# Patient Record
Sex: MALE | Race: WHITE | URBAN - METROPOLITAN AREA
[De-identification: names, ages, dates, MRNs, and addresses within clinical notes are randomized per-mention and may not be internally consistent; named-entity substitution may affect disease eponyms.]

---

## 2021-04-16 DIAGNOSIS — Z23 ENCOUNTER FOR IMMUNIZATION: ICD-10-CM

## 2021-04-23 ENCOUNTER — IMMUNIZATIONS (OUTPATIENT)
Dept: FAMILY MEDICINE CLINIC | Facility: HOSPITAL | Age: 46
End: 2021-04-23

## 2021-04-23 DIAGNOSIS — Z23 ENCOUNTER FOR IMMUNIZATION: Primary | ICD-10-CM

## 2021-04-23 PROCEDURE — 91301 SARS-COV-2 / COVID-19 MRNA VACCINE (MODERNA) 100 MCG: CPT

## 2021-04-23 PROCEDURE — 0011A SARS-COV-2 / COVID-19 MRNA VACCINE (MODERNA) 100 MCG: CPT

## 2021-10-12 ENCOUNTER — IMMUNIZATIONS (OUTPATIENT)
Dept: FAMILY MEDICINE CLINIC | Facility: HOSPITAL | Age: 46
End: 2021-10-12

## 2021-10-12 DIAGNOSIS — Z23 ENCOUNTER FOR IMMUNIZATION: Primary | ICD-10-CM

## 2021-10-12 PROCEDURE — 91301 SARS-COV-2 / COVID-19 MRNA VACCINE (MODERNA) 100 MCG: CPT

## 2021-10-12 PROCEDURE — 0012A SARS-COV-2 / COVID-19 MRNA VACCINE (MODERNA) 100 MCG: CPT

## 2022-03-07 ENCOUNTER — OFFICE VISIT (OUTPATIENT)
Dept: OBGYN CLINIC | Facility: HOSPITAL | Age: 47
End: 2022-03-07
Payer: COMMERCIAL

## 2022-03-07 ENCOUNTER — HOSPITAL ENCOUNTER (OUTPATIENT)
Dept: RADIOLOGY | Facility: HOSPITAL | Age: 47
Discharge: HOME | End: 2022-03-07
Payer: COMMERCIAL

## 2022-03-07 VITALS
HEIGHT: 69 IN | HEART RATE: 71 BPM | WEIGHT: 240 LBS | BODY MASS INDEX: 35.55 KG/M2 | DIASTOLIC BLOOD PRESSURE: 84 MMHG | SYSTOLIC BLOOD PRESSURE: 144 MMHG

## 2022-03-07 DIAGNOSIS — M79.672 PAIN IN LEFT FOOT: Primary | ICD-10-CM

## 2022-03-07 DIAGNOSIS — M79.672 PAIN IN LEFT FOOT: ICD-10-CM

## 2022-03-07 DIAGNOSIS — M84.375A STRESS FRACTURE OF METATARSAL BONE OF LEFT FOOT, INITIAL ENCOUNTER: ICD-10-CM

## 2022-03-07 PROCEDURE — 99203 OFFICE O/P NEW LOW 30 MIN: CPT | Performed by: PHYSICIAN ASSISTANT

## 2022-03-07 PROCEDURE — 73630 X-RAY EXAM OF FOOT: CPT

## 2022-03-07 NOTE — PROGRESS NOTES
Assessment:    Left plantar based 2nd MTP pain, activity related only which has basically resolved with rest alone  Possible stress fracture 2nd metatarsal      Plan: Will provide Cam boot for weight-bearing which patient can wear if pain develops again  Recommend continued rest and holding off on running over the next 4 weeks  Ibuprofen as needed  Follow up with the podiatry team for re-evaluation   May need MRI of the left foot in the future if his symptoms arise  Problem List Items Addressed This Visit        Other    Pain in left foot - Primary                   Subjective:     Patient ID:  Edgardo Wyatt is a 40 y o  male    HPI    67-year-old male presenting for evaluation of his left foot  According to the patient, about 2-3 weeks ago he developed pain localized to the plantar surface of his foot that came about while running on the treadmill  He typically jogs on the treadmill frequently and noticed that the sole of his foot just proximal to the second toe has been painful which he describes as sharp and sometimes dull that has persisted  He had to take ibuprofen with some relief  He states he stopped running on the treadmill and it basically resolved on its own  He walks several miles over the weekend without any issues  There is no injury or trauma to the area  No associated numbness and tingling  He denies any ankle pain  The following portions of the patient's history were reviewed and updated as appropriate: allergies, current medications, past family history, past medical history, past social history, past surgical history and problem list     Review of Systems     Objective:    Imaging:  Left foot x-rays no acute abnormalities  Vitals:    03/07/22 0837   BP: 144/84   Pulse: 71         Physical Exam     Orthopedic Examination:  Left foot     Inspection:  There is no open wounds or erythema  No swelling  No ecchymosis      Palpation:  The 1st-2nd tarsal metatarsal region is not tender to palpation   Fifth metatarsal zone is nontender to palpation  NTTP to palpation at the 2nd  MTPe    Range-of-motion:  Normal range of motion of the ankle    Strength:  5/5 ankle plantar dorsiflexion    Sensation:  Intact    Special Tests:  Foot is of normal color, good cap refill of toes

## 2022-05-04 ENCOUNTER — OFFICE VISIT (OUTPATIENT)
Dept: OBGYN CLINIC | Facility: HOSPITAL | Age: 47
End: 2022-05-04
Payer: COMMERCIAL

## 2022-05-04 ENCOUNTER — HOSPITAL ENCOUNTER (OUTPATIENT)
Dept: RADIOLOGY | Facility: HOSPITAL | Age: 47
Discharge: HOME | End: 2022-05-04
Payer: COMMERCIAL

## 2022-05-04 DIAGNOSIS — M25.562 ACUTE PAIN OF LEFT KNEE: ICD-10-CM

## 2022-05-04 DIAGNOSIS — M25.562 ACUTE PAIN OF LEFT KNEE: Primary | ICD-10-CM

## 2022-05-04 PROCEDURE — 99213 OFFICE O/P EST LOW 20 MIN: CPT | Performed by: PHYSICIAN ASSISTANT

## 2022-05-04 PROCEDURE — 73562 X-RAY EXAM OF KNEE 3: CPT

## 2022-05-04 RX ORDER — MELOXICAM 15 MG/1
15 TABLET ORAL DAILY
Qty: 14 TABLET | Refills: 0 | Status: SHIPPED | OUTPATIENT
Start: 2022-05-04

## 2022-05-04 NOTE — PROGRESS NOTES
Assessment:    1 ) Patellar tendonitis   2 ) consideration to internal derangement given mechanism        Plan: Activities and exercise to tolerance   Rest as able   Rx Mobic x 2 weeks, then transition to OTC pain medications as needed  Initiate formal outpatient PT for treatment  Follow-up 6 weeks with Dr Plascencia Check for re-evaluation          Problem List Items Addressed This Visit        Other    Acute pain of left knee - Primary    Relevant Orders    XR knee 3 vw left non injury                   Subjective:     Patient ID:  Helen Bernardo is a 40 y o  male    HPI    43-year-old male presenting for evaluation of his left knee  According to the patient, about 2 months ago he had a fall on ice directly on the anterior knee and has  had pain since that time  He states the pain is mostly localized to the anterior knee with some radiation distally to the shin as well as to the medial knee  He is able to walk and even jog with little pain however he states it feels stiff when he wakes up in the morning especially when he bends his knee to tie his shoes this is when it hurts him  No associated numbness and tingling  He has not noticed any swelling compared to the contralateral side      The following portions of the patient's history were reviewed and updated as appropriate: allergies, current medications, past family history, past medical history, past social history, past surgical history and problem list     Review of Systems     Objective:    Imaging:  Left knee x-rays demonstrate no acute fracture or dislocation        Physical Exam     Orthopedic Examination:  Patient ambulates without assistance or antalgia  Left knee     Inspection:  No open wounds or erythema  No effusion  No sign no ecchymosis  Palpation:  No warmth    Patellar tendon is tender to palpation without palpable gap  Quadriceps tendon nontender to palpation  Medial joint line is mildly tender to palpation whole however only with deep flexion of the knee   The pes anserine bursa is nontender to palpation      Range-of-motion:  0 to 130, no extensor lag    Strength:  5/5 hip flexion knee extension ankle plantar dorsiflexion    Sensation:  Intact    Special Tests:  Negative María's   Negative patellar apprehension   No pain or instability with anterior posterior drawer, Lachman's   Extensor mechanism is intact

## 2022-05-18 ENCOUNTER — EVALUATION (OUTPATIENT)
Dept: PHYSICAL THERAPY | Facility: CLINIC | Age: 47
End: 2022-05-18
Payer: COMMERCIAL

## 2022-05-18 DIAGNOSIS — M25.562 ACUTE PAIN OF LEFT KNEE: Primary | ICD-10-CM

## 2022-05-18 PROCEDURE — 97110 THERAPEUTIC EXERCISES: CPT | Performed by: PHYSICAL THERAPIST

## 2022-05-18 PROCEDURE — 97161 PT EVAL LOW COMPLEX 20 MIN: CPT | Performed by: PHYSICAL THERAPIST

## 2022-05-18 NOTE — PROGRESS NOTES
PT Evaluation    Today's date: 2022   Patient name: Sisi Morgan  : 1975  MRN: [id number]  Referring provider: Debbie Millan  Dx:   Encounter Diagnosis     ICD-10-CM    1  Acute pain of left knee  M25 562 Ambulatory Referral to Physical Therapy           Subjective Evaluation     History of Present Illness    Patient presents with c/o L knee pain falling on ice and fell straight on it 12 weeks ago  After a few weeks the pain went away but never 100% better  He likes to lift weights, run and walk his dogs  He can walk 2 miles/day  Anti-inflammatory helped 10-20%  He is deadlifting but hasn't really done squatting  It does wake him up in the middle of the night  Neuro signs: none  Red flags: none  Occupation: [employer]      Pain  At best pain ratin  At worst pain ratin  Location:tibial tubercle and anterior shin  Quality: sharp, throbbing  Relieving factors: medication, when it cracks when seated and he pushes knee into IR  Aggravating factors: running pounding, at night and sleeping, putting on sock in AM, first thing in AM    Social Support  Lives at home      Patient Goals  Patient goals for therapy: get back to 100%- running, squatting, deadlifting    STGs   1  Decrease pain by 20% in 2-4 weeks to improve standing tolerance  2  Improve knee flex ROM to full s pain in 2-4 weeks to squatting  3  Improve hip strength by 1/3 grade in 2-4 weeks to improve stairs performance to step through in the AM       LTGs  1  Decrease pain by 60% in 6-8 weeks  2  Improve running tolerance to >30 minutes in 6-8 weeks to perform community ambulation  3  Perform job activities independently without pain in 6-8 weeks  4  Improve stairs tolerance to 1 flight  in 8 weeks  5  Improve deep squat and lifting routine to no pain in 6 weeks      Objective Measurements:    Observation:  Jogging: decreased L push off and painful on accepting weight  Sensation:-    Functional squat: deep squat brought on pain  Better after quad stretch  Slump:  SLR: Vickye Milder: L + Faddir: -   Elys: 95L painful  R 120    Meniscal Pathology Composite Score: -  Hx of locking/catching: - Joint line tenderness:-  Pain with hyperextension:-  Pain with hyperflexion:+  María:-    Lachmans: - Posterior drawer:- Anterior drawer:- MCL0: - MCL30:-     Tibial Rotation: ER/IR WFL  Shin pain more c knee flexion c IR force    Knee A/PROM L R Strength L R   Flex  [facility] /120p brought pain on in shin  / Flex 4p 5   Ext  WFL/  / Ext 5 5           Hip A/PROM        Flex  /  / Flex 5 5   ER at 90   ER     IR at 90 Shin pain  IR     Abd   Abd 5 5   Ext   Ext 5 5           Lumbar AROM   Ankle     Flex WFL  DF     Ext WFL  PF     SB        EIL  no effect       Rot                Assessment:    Marly Lara is a pleasant 40 y o  male who presents with primary movement impairment diagnosis of knee flexion hypomobility resulting in pathoanatomical symptoms of quad tendon strain   This is limiting  his ability to run and perform lifting s pain  The patient's greatest concern is returning to 100%  No further referral appears necessary at this time based upon examination results  Etiologic factors include fall and landing on knee  Negative prognostic indicators:none  Positive prognostic indicators: good strength and overall health  Please contact me if you have any further questions or recommendations  Thank you very much for the kind referral         Plan  Patient would benefit from:Skilled physical therapy  Planned therapy interventions: manual therapy, neuromuscular re-education, stretching, strengthening, therapeutic activities, therapeutic exercise, patient education, home exercise program, and activity modification      Frequency: 2x week  Duration in weeks: 6  Treatment plan discussed with: patient             Goals: Patient's goal is returning to lifting and running at 100%    Precautions: none  Dx: L knee flexion hypomobility- quad strain from fall  Pain c sleeping ask about position     Daily Treatment Diary   Manuals 5/18/2022        graston to L patellar/quad tendon                                    Ther Ex         bike         bridges 10x        Prone quad st 3x :30        lunges         SL LP                                             Neuro Re-ed         SLS on foam                                             Ther Activity         Jogging laps         squatting                  Gait Training                           Modalities

## 2022-05-24 ENCOUNTER — OFFICE VISIT (OUTPATIENT)
Dept: PHYSICAL THERAPY | Facility: CLINIC | Age: 47
End: 2022-05-24
Payer: COMMERCIAL

## 2022-05-24 DIAGNOSIS — M25.562 ACUTE PAIN OF LEFT KNEE: Primary | ICD-10-CM

## 2022-05-24 PROCEDURE — 97110 THERAPEUTIC EXERCISES: CPT | Performed by: PHYSICAL THERAPIST

## 2022-05-24 PROCEDURE — 97140 MANUAL THERAPY 1/> REGIONS: CPT | Performed by: PHYSICAL THERAPIST

## 2022-05-24 PROCEDURE — 97112 NEUROMUSCULAR REEDUCATION: CPT | Performed by: PHYSICAL THERAPIST

## 2022-05-24 NOTE — PROGRESS NOTES
Daily Note     Today's date: 2022  Patient name: Leticia Spicer  : 1975  MRN: [id number]  Referring provider: Dhaval Marks  Dx:   Encounter Diagnosis     ICD-10-CM    1  Acute pain of left knee  M25 562                   Subjective: He still feels stiffness at night and in the AM  He sleeps on his side mostly  He has trialed running c some stiffness  He does have discomfort c deadlifts #225  Objective: See treatment diary below      Assessment: Tolerated treatment well  He had no pain c squatting after knee ext PROM  Patient would benefit from continued PT      Plan: Continue per plan of care        Goals: Patient's goal is returning to lifting and running at 100%    Precautions: none  Dx: L knee flexion hypomobility- quad strain from fall  Pain c sleeping ask about position     Daily Treatment Diary   Manuals 2022       chay to L patellar/quad tendon  6' hold nv       Knee ext PROM  5'                         Ther Ex         bike  6'       bridges 10x 10x2       Prone quad st 3x :30 3x :30       lunges         SL LP  #65 20x       Good mornings #30  20x                                  Neuro Re-ed         SLS on foam  5x :10       Quad set  10x: 05       TKE BTB  20x :05                         Ther Activity         Jogging laps  3 laps       squatting  4x10                Gait Training                           Modalities

## 2022-06-01 ENCOUNTER — OFFICE VISIT (OUTPATIENT)
Dept: PHYSICAL THERAPY | Facility: CLINIC | Age: 47
End: 2022-06-01
Payer: COMMERCIAL

## 2022-06-01 DIAGNOSIS — M25.562 ACUTE PAIN OF LEFT KNEE: Primary | ICD-10-CM

## 2022-06-01 PROCEDURE — 97112 NEUROMUSCULAR REEDUCATION: CPT | Performed by: PHYSICAL THERAPIST

## 2022-06-01 PROCEDURE — 97110 THERAPEUTIC EXERCISES: CPT | Performed by: PHYSICAL THERAPIST

## 2022-06-01 PROCEDURE — 97140 MANUAL THERAPY 1/> REGIONS: CPT | Performed by: PHYSICAL THERAPIST

## 2022-06-01 NOTE — PROGRESS NOTES
Daily Note     Today's date: 2022  Patient name: Nereida Tompkins  : 1975  MRN: [id number]  Referring provider: Jag Rodríguez  Dx:   Encounter Diagnosis     ICD-10-CM    1  Acute pain of left knee  M25 562                   Subjective: He states he overall is feeling better as he is running more  He still gets pain c hip ER as well as standing hip flexor st        Objective: See treatment diary below      Assessment: Tolerated treatment well  Pistol squat was still uncomfortable  Hip ER did not create change  Trialed both tibial IR/ER mobs s any change  Patient would benefit from continued PT      Plan: Continue per plan of care        Goals: Patient's goal is returning to lifting and running at 100%    Precautions: none  Dx: L knee flexion hypomobility- quad strain from fall      Daily Treatment Diary   Manuals 2022      graston to L patellar/quad tendon  6' hold nv       Knee ext PROM  5' 4'      Tibial ER mobs   5' trialed               Ther Ex         Treadmill- walk jog walk         bridges 10x 10x2       Prone quad st 3x :30 3x :30 3x :30      lunges   Walking 1 lap      SL LP  #65 20x nv      Good mornings #30  20x 2x15      SL RDL   10x                        Neuro Re-ed         SLS on foam  5x :10       Quad set  10x: 05       TKE BTB  20x :05 20x                        Ther Activity         Jogging laps  3 laps       squatting  4x10 10x               Gait Training                           Modalities

## 2022-06-08 ENCOUNTER — OFFICE VISIT (OUTPATIENT)
Dept: PHYSICAL THERAPY | Facility: CLINIC | Age: 47
End: 2022-06-08
Payer: COMMERCIAL

## 2022-06-08 DIAGNOSIS — M25.562 ACUTE PAIN OF LEFT KNEE: Primary | ICD-10-CM

## 2022-06-08 PROCEDURE — 97110 THERAPEUTIC EXERCISES: CPT | Performed by: PHYSICAL THERAPIST

## 2022-06-08 PROCEDURE — 97112 NEUROMUSCULAR REEDUCATION: CPT | Performed by: PHYSICAL THERAPIST

## 2022-06-08 NOTE — PROGRESS NOTES
Daily Note     Today's date: 2022  Patient name: Erick Montero  : 1975  MRN: [id number]  Referring provider: Roberto Carlos Quach  Dx:   Encounter Diagnosis     ICD-10-CM    1  Acute pain of left knee  M25 562                   Subjective:  Pt presents today stating he feels as though he is trending better  Pain with kneeling, Quad ST in standing, and hyperflexion such as that on a Leg Press  Objective: See treatment diary below    Palpation pain only in end range knee flexion along lateral tibial region upper 1/3rd  Unloaded knee Ext NB  Pt OP Semi Loaded Knee Ext NB  Loaded Knee Ext B  Functional assessment  L knee Flexion pain PT OP  NB/NB/B  Knee Ext PT OP A  Squat Better  Assessment:  Pt was able to improve squat as well as end range flexion with PT OP symptoms  Abolished PT OP Knee ext  Encouraged to trial Loaded Knee Ext with Pt OP for HEP, 10-20 reps every 2 hours, used bank account analogy for performance  Pt in complete accord  If derangement with DP of Loaded Pt OP Knee ext is not proper, likely contractile dysfunction with flexion end range pain provocation  Follow up n v  as able  Plan: Continue per plan of care        Goals: Patient's goal is returning to lifting and running at 100%    Precautions: none  Dx: L knee flexion hypomobility- quad strain from fall      Daily Treatment Diary   Manuals 2022     graston to L patellar/quad tendon  6' hold nv       Knee ext PROM  5' 4'      Tibial ER mobs   5' trialed          Assessment x 20     Ther Ex         Treadmill- walk jog walk         bridges 10x 10x2       Prone quad st 3x :30 3x :30 3x :30      lunges   Walking 1 lap      SL LP  #65 20x nv      Good mornings #30  20x 2x15      SL RDL   10x                        Neuro Re-ed         SLS on foam  5x :10       Quad set  10x: 05       TKE BTB  20x :05 20x          Education x 10 min              Ther Activity         Jogging laps  3 laps squatting  4x10 10x               Gait Training                           Modalities

## 2022-06-15 ENCOUNTER — APPOINTMENT (OUTPATIENT)
Dept: PHYSICAL THERAPY | Facility: CLINIC | Age: 47
End: 2022-06-15
Payer: COMMERCIAL

## 2022-06-22 ENCOUNTER — APPOINTMENT (OUTPATIENT)
Dept: PHYSICAL THERAPY | Facility: CLINIC | Age: 47
End: 2022-06-22
Payer: COMMERCIAL

## 2022-06-29 ENCOUNTER — APPOINTMENT (OUTPATIENT)
Dept: PHYSICAL THERAPY | Facility: CLINIC | Age: 47
End: 2022-06-29
Payer: COMMERCIAL

## 2022-06-30 NOTE — PROGRESS NOTES
Discharge Note  Henry Isaacs has made good functional progress with physical therapy  he was educated and updated in his home exercise program  David Macias will be discharged from formal therapy due to no further appointments scheduled but will follow up as needed

## 2024-09-30 ENCOUNTER — OFFICE VISIT (OUTPATIENT)
Dept: INTERNAL MEDICINE CLINIC | Facility: CLINIC | Age: 49
End: 2024-09-30
Payer: COMMERCIAL

## 2024-09-30 VITALS
WEIGHT: 236 LBS | OXYGEN SATURATION: 98 % | BODY MASS INDEX: 34.96 KG/M2 | SYSTOLIC BLOOD PRESSURE: 128 MMHG | HEART RATE: 78 BPM | DIASTOLIC BLOOD PRESSURE: 84 MMHG | TEMPERATURE: 97.4 F | HEIGHT: 69 IN

## 2024-09-30 DIAGNOSIS — Z00.00 ANNUAL PHYSICAL EXAM: Primary | ICD-10-CM

## 2024-09-30 DIAGNOSIS — Z12.11 SCREEN FOR COLON CANCER: ICD-10-CM

## 2024-09-30 DIAGNOSIS — R42 VERTIGO: ICD-10-CM

## 2024-09-30 PROCEDURE — 99203 OFFICE O/P NEW LOW 30 MIN: CPT | Performed by: NURSE PRACTITIONER

## 2024-09-30 PROCEDURE — 93000 ELECTROCARDIOGRAM COMPLETE: CPT | Performed by: NURSE PRACTITIONER

## 2024-09-30 PROCEDURE — 99386 PREV VISIT NEW AGE 40-64: CPT | Performed by: NURSE PRACTITIONER

## 2024-09-30 RX ORDER — MAGNESIUM 30 MG
30 TABLET ORAL 2 TIMES DAILY
COMMUNITY

## 2024-09-30 RX ORDER — ASCORBATE CALCIUM 500 MG
500 TABLET ORAL DAILY
COMMUNITY

## 2024-09-30 RX ORDER — UREA 10 %
500 LOTION (ML) TOPICAL DAILY
COMMUNITY

## 2024-09-30 RX ORDER — ZINC GLUCONATE 50 MG
50 TABLET ORAL DAILY
COMMUNITY

## 2024-09-30 NOTE — PROGRESS NOTES
Adult Annual Physical  Name: Alvarez Wisdom      : 1975      MRN: [id number]  Encounter Provider: PETE Palma  Encounter Date: 2024   Encounter department: [facility] INTERNAL MEDICINE    Assessment & Plan  Annual physical exam    Orders:    Comprehensive metabolic panel; Future    CBC and differential; Future    Lipid Panel with Direct LDL reflex; Future    TSH, 3rd generation with Free T4 reflex; Future    Vertigo  EKG shows SB with right BBB.   Check labs.   Discussed meclizine and/or vestibular therapy if symptoms persist.   Reviewed symptoms that warrant further evaluation including any severe headache, balance/coordination/speech issues, chest pain, or shortness of breath.   Orders:    POCT ECG    Screen for colon cancer    Orders:    Cologuard    Immunizations and preventive care screenings were discussed with patient today. Appropriate education was printed on patient's after visit summary.           History of Present Illness     Adult Annual Physical:  Patient presents for annual physical. Establish care visit.     3 weeks ago he had an episode of dizziness.   He went out to dinner, had a few drinks. Went home and woke up overnight with dizziness. Room spinning sensation.   Worse when changing positions in bed. The next day symptoms improved but he felt foggy.  This weekend he had a few more dizziness episodes. Nothing like the first episode. However he continues to feel foggy.   No chest pain or shortness of breath. Mild diaphoresis. No nausea or vomiting.  .     Diet and Physical Activity:  - Diet/Nutrition: well balanced diet.  - Exercise: walking and 3-4 times a week on average.    Depression Screening:  - PHQ-2 Score: 0    General Health:  - Sleep: sleeps well.  - Hearing: normal hearing right ear and normal hearing left ear.  - Vision: wears glasses and most recent eye exam > 1 year ago.  - Dental: regular dental visits.     Health:    - Urinary symptoms:  "none.     Review of Systems   Constitutional:  Negative for activity change, appetite change, fatigue and unexpected weight change.   Eyes:  Negative for photophobia and visual disturbance.   Respiratory:  Negative for cough and shortness of breath.    Cardiovascular:  Negative for chest pain, palpitations and leg swelling.   Gastrointestinal:  Negative for abdominal pain, blood in stool, constipation, diarrhea, nausea and vomiting.   Genitourinary:  Negative for difficulty urinating.   Musculoskeletal:  Negative for arthralgias.   Skin:  Negative for rash.   Neurological:  Positive for dizziness. Negative for syncope, speech difficulty, weakness, light-headedness, numbness and headaches.   Psychiatric/Behavioral:  Negative for sleep disturbance.          Objective     /84 (BP Location: Left arm, Patient Position: Sitting, Cuff Size: Standard)   Pulse 78   Temp (!) 97.4 °F (36.3 °C)   Ht 5' 9\" (1.753 m)   Wt 107 kg (236 lb)   SpO2 98%   BMI 34.85 kg/m²     Physical Exam  Vitals reviewed.   Constitutional:       Appearance: Normal appearance. He is well-developed.   HENT:      Head: Normocephalic and atraumatic.      Right Ear: Tympanic membrane, ear canal and external ear normal.      Left Ear: Tympanic membrane, ear canal and external ear normal.   Eyes:      Conjunctiva/sclera: Conjunctivae normal.   Neck:      Thyroid: No thyromegaly.   Cardiovascular:      Rate and Rhythm: Normal rate and regular rhythm.      Heart sounds: Normal heart sounds.   Pulmonary:      Effort: Pulmonary effort is normal.      Breath sounds: Normal breath sounds.   Abdominal:      General: Bowel sounds are normal.      Palpations: Abdomen is soft.   Musculoskeletal:         General: Normal range of motion.      Cervical back: Neck supple.      Right lower leg: No edema.      Left lower leg: No edema.   Skin:     General: Skin is warm and dry.   Neurological:      General: No focal deficit present.      Mental Status: He is " alert and oriented to person, place, and time.   Psychiatric:         Mood and Affect: Mood normal.         Behavior: Behavior normal.

## 2024-09-30 NOTE — ASSESSMENT & PLAN NOTE
EKG shows SB with right BBB.   Check labs.   Discussed meclizine and/or vestibular therapy if symptoms persist.   Reviewed symptoms that warrant further evaluation including any severe headache, balance/coordination/speech issues, chest pain, or shortness of breath.   Orders:    POCT ECG

## 2024-10-04 ENCOUNTER — APPOINTMENT (OUTPATIENT)
Dept: LAB | Facility: CLINIC | Age: 49
End: 2024-10-04
Payer: COMMERCIAL

## 2024-10-04 DIAGNOSIS — R73.01 IFG (IMPAIRED FASTING GLUCOSE): ICD-10-CM

## 2024-10-04 DIAGNOSIS — Z00.00 ANNUAL PHYSICAL EXAM: ICD-10-CM

## 2024-10-04 DIAGNOSIS — R79.89 ABNORMAL TSH: Primary | ICD-10-CM

## 2024-10-04 LAB
ALBUMIN SERPL BCG-MCNC: 4.5 G/DL (ref 3.5–5)
ALP SERPL-CCNC: 37 U/L (ref 34–104)
ALT SERPL W P-5'-P-CCNC: 20 U/L (ref 7–52)
ANION GAP SERPL CALCULATED.3IONS-SCNC: 5 MMOL/L (ref 4–13)
AST SERPL W P-5'-P-CCNC: 17 U/L (ref 13–39)
BASOPHILS # BLD AUTO: 0.04 THOUSANDS/ΜL (ref 0–0.1)
BASOPHILS NFR BLD AUTO: 1 % (ref 0–1)
BILIRUB SERPL-MCNC: 0.65 MG/DL (ref 0.2–1)
BUN SERPL-MCNC: 16 MG/DL (ref 5–25)
CALCIUM SERPL-MCNC: 9.3 MG/DL (ref 8.4–10.2)
CHLORIDE SERPL-SCNC: 104 MMOL/L (ref 96–108)
CHOLEST SERPL-MCNC: 189 MG/DL
CO2 SERPL-SCNC: 28 MMOL/L (ref 21–32)
CREAT SERPL-MCNC: 1.04 MG/DL (ref 0.6–1.3)
EOSINOPHIL # BLD AUTO: 0.15 THOUSAND/ΜL (ref 0–0.61)
EOSINOPHIL NFR BLD AUTO: 3 % (ref 0–6)
ERYTHROCYTE [DISTWIDTH] IN BLOOD BY AUTOMATED COUNT: 12.6 % (ref 11.6–15.1)
GFR SERPL CREATININE-BSD FRML MDRD: 85 ML/MIN/1.73SQ M
GLUCOSE P FAST SERPL-MCNC: 103 MG/DL (ref 65–99)
HCT VFR BLD AUTO: 44.5 % (ref 36.5–49.3)
HDLC SERPL-MCNC: 44 MG/DL
HGB BLD-MCNC: 14.7 G/DL (ref 12–17)
IMM GRANULOCYTES # BLD AUTO: 0.01 THOUSAND/UL (ref 0–0.2)
IMM GRANULOCYTES NFR BLD AUTO: 0 % (ref 0–2)
LDLC SERPL CALC-MCNC: 125 MG/DL (ref 0–100)
LYMPHOCYTES # BLD AUTO: 1.78 THOUSANDS/ΜL (ref 0.6–4.47)
LYMPHOCYTES NFR BLD AUTO: 34 % (ref 14–44)
MCH RBC QN AUTO: 29.5 PG (ref 26.8–34.3)
MCHC RBC AUTO-ENTMCNC: 33 G/DL (ref 31.4–37.4)
MCV RBC AUTO: 89 FL (ref 82–98)
MONOCYTES # BLD AUTO: 0.61 THOUSAND/ΜL (ref 0.17–1.22)
MONOCYTES NFR BLD AUTO: 12 % (ref 4–12)
NEUTROPHILS # BLD AUTO: 2.64 THOUSANDS/ΜL (ref 1.85–7.62)
NEUTS SEG NFR BLD AUTO: 50 % (ref 43–75)
NRBC BLD AUTO-RTO: 0 /100 WBCS
PLATELET # BLD AUTO: 334 THOUSANDS/UL (ref 149–390)
PMV BLD AUTO: 9.9 FL (ref 8.9–12.7)
POTASSIUM SERPL-SCNC: 4.1 MMOL/L (ref 3.5–5.3)
PROT SERPL-MCNC: 7.3 G/DL (ref 6.4–8.4)
RBC # BLD AUTO: 4.98 MILLION/UL (ref 3.88–5.62)
SODIUM SERPL-SCNC: 137 MMOL/L (ref 135–147)
T4 FREE SERPL-MCNC: 0.63 NG/DL (ref 0.61–1.12)
TRIGL SERPL-MCNC: 100 MG/DL
TSH SERPL DL<=0.05 MIU/L-ACNC: 5.28 UIU/ML (ref 0.45–4.5)
WBC # BLD AUTO: 5.23 THOUSAND/UL (ref 4.31–10.16)

## 2024-10-04 PROCEDURE — 36415 COLL VENOUS BLD VENIPUNCTURE: CPT

## 2024-10-04 PROCEDURE — 84439 ASSAY OF FREE THYROXINE: CPT

## 2024-10-04 PROCEDURE — 80061 LIPID PANEL: CPT

## 2024-10-04 PROCEDURE — 80053 COMPREHEN METABOLIC PANEL: CPT

## 2024-10-04 PROCEDURE — 84443 ASSAY THYROID STIM HORMONE: CPT

## 2024-10-04 PROCEDURE — 85025 COMPLETE CBC W/AUTO DIFF WBC: CPT
